# Patient Record
Sex: MALE | Race: ASIAN | URBAN - METROPOLITAN AREA
[De-identification: names, ages, dates, MRNs, and addresses within clinical notes are randomized per-mention and may not be internally consistent; named-entity substitution may affect disease eponyms.]

---

## 2022-09-13 ENCOUNTER — APPOINTMENT (RX ONLY)
Dept: URBAN - METROPOLITAN AREA CLINIC 91 | Facility: CLINIC | Age: 32
Setting detail: DERMATOLOGY
End: 2022-09-13

## 2022-09-13 DIAGNOSIS — L29.89 OTHER PRURITUS: ICD-10-CM

## 2022-09-13 DIAGNOSIS — L29.8 OTHER PRURITUS: ICD-10-CM

## 2022-09-13 DIAGNOSIS — L73.8 OTHER SPECIFIED FOLLICULAR DISORDERS: ICD-10-CM

## 2022-09-13 PROCEDURE — ? COUNSELING

## 2022-09-13 PROCEDURE — ? PRESCRIPTION

## 2022-09-13 PROCEDURE — ? ADDITIONAL NOTES

## 2022-09-13 PROCEDURE — 99203 OFFICE O/P NEW LOW 30 MIN: CPT

## 2022-09-13 RX ORDER — HYDROCORTISONE 25 MG/G
CREAM TOPICAL
Qty: 30 | Refills: 10 | Status: ERX | COMMUNITY
Start: 2022-09-13

## 2022-09-13 RX ADMIN — HYDROCORTISONE: 25 CREAM TOPICAL at 00:00

## 2022-09-13 NOTE — HPI: RASH
What Type Of Note Output Would You Prefer (Optional)?: Standard Output
How Severe Is Your Rash?: moderate
Is This A New Presentation, Or A Follow-Up?: Rash
Additional History: Patient was treated for this problem back in 2020 by another dermatologist in [place] and was prescribed an ointment ( patient unsure of name),patient stated that this reoccurs often after oral sex and states it doesn’t completely heal but some times it seems calmer than other days. When patient has a flare up he experiences itchiness, red bumps, and irriatation, located in the groin/ genatalia.

## 2022-09-13 NOTE — PROCEDURE: ADDITIONAL NOTES
Render Risk Assessment In Note?: no
Additional Notes: Given gentle OTC cleansers.
Detail Level: Simple
Additional Notes: Patient reports chronic itching in the genital area that comes and goes. He does not ever note a rash or sores of any kind in this area. He has noted for a long time some “bumps” in this area but upon exam normal oil glands are what the patient points out. Will try a short course of topical therapy for 2 weeks to see if this helps the itching. Also discussed gentle skin care and fragrance free personal care products/washes.